# Patient Record
Sex: FEMALE | Race: WHITE | NOT HISPANIC OR LATINO
[De-identification: names, ages, dates, MRNs, and addresses within clinical notes are randomized per-mention and may not be internally consistent; named-entity substitution may affect disease eponyms.]

---

## 2019-02-26 ENCOUNTER — RESULT REVIEW (OUTPATIENT)
Age: 59
End: 2019-02-26

## 2021-04-27 ENCOUNTER — RESULT REVIEW (OUTPATIENT)
Age: 61
End: 2021-04-27

## 2021-06-22 DIAGNOSIS — Z87.828 PERSONAL HISTORY OF OTHER (HEALED) PHYSICAL INJURY AND TRAUMA: ICD-10-CM

## 2021-07-07 ENCOUNTER — APPOINTMENT (OUTPATIENT)
Dept: CARDIOLOGY | Facility: CLINIC | Age: 61
End: 2021-07-07

## 2022-03-13 ENCOUNTER — EMERGENCY (EMERGENCY)
Facility: HOSPITAL | Age: 62
LOS: 1 days | Discharge: HOME | End: 2022-03-13
Attending: STUDENT IN AN ORGANIZED HEALTH CARE EDUCATION/TRAINING PROGRAM | Admitting: STUDENT IN AN ORGANIZED HEALTH CARE EDUCATION/TRAINING PROGRAM
Payer: COMMERCIAL

## 2022-03-13 VITALS
RESPIRATION RATE: 20 BRPM | DIASTOLIC BLOOD PRESSURE: 70 MMHG | TEMPERATURE: 98 F | SYSTOLIC BLOOD PRESSURE: 150 MMHG | OXYGEN SATURATION: 98 % | HEIGHT: 66 IN | HEART RATE: 100 BPM

## 2022-03-13 LAB
ALBUMIN SERPL ELPH-MCNC: 4.1 G/DL — SIGNIFICANT CHANGE UP (ref 3.3–5)
ALP SERPL-CCNC: 73 U/L — SIGNIFICANT CHANGE UP (ref 40–120)
ALT FLD-CCNC: 41 U/L — HIGH (ref 4–33)
ANION GAP SERPL CALC-SCNC: 13 MMOL/L — SIGNIFICANT CHANGE UP (ref 7–14)
AST SERPL-CCNC: 14 U/L — SIGNIFICANT CHANGE UP (ref 4–32)
B PERT DNA SPEC QL NAA+PROBE: SIGNIFICANT CHANGE UP
B PERT+PARAPERT DNA PNL SPEC NAA+PROBE: SIGNIFICANT CHANGE UP
BASOPHILS # BLD AUTO: 0.04 K/UL — SIGNIFICANT CHANGE UP (ref 0–0.2)
BASOPHILS NFR BLD AUTO: 0.5 % — SIGNIFICANT CHANGE UP (ref 0–2)
BILIRUB SERPL-MCNC: 0.4 MG/DL — SIGNIFICANT CHANGE UP (ref 0.2–1.2)
BORDETELLA PARAPERTUSSIS (RAPRVP): SIGNIFICANT CHANGE UP
BUN SERPL-MCNC: 16 MG/DL — SIGNIFICANT CHANGE UP (ref 7–23)
C PNEUM DNA SPEC QL NAA+PROBE: SIGNIFICANT CHANGE UP
CALCIUM SERPL-MCNC: 9 MG/DL — SIGNIFICANT CHANGE UP (ref 8.4–10.5)
CHLORIDE SERPL-SCNC: 98 MMOL/L — SIGNIFICANT CHANGE UP (ref 98–107)
CO2 SERPL-SCNC: 25 MMOL/L — SIGNIFICANT CHANGE UP (ref 22–31)
CREAT SERPL-MCNC: 0.82 MG/DL — SIGNIFICANT CHANGE UP (ref 0.5–1.3)
EGFR: 81 ML/MIN/1.73M2 — SIGNIFICANT CHANGE UP
EOSINOPHIL # BLD AUTO: 0.16 K/UL — SIGNIFICANT CHANGE UP (ref 0–0.5)
EOSINOPHIL NFR BLD AUTO: 2.2 % — SIGNIFICANT CHANGE UP (ref 0–6)
FLUAV SUBTYP SPEC NAA+PROBE: SIGNIFICANT CHANGE UP
FLUBV RNA SPEC QL NAA+PROBE: SIGNIFICANT CHANGE UP
GLUCOSE SERPL-MCNC: 168 MG/DL — HIGH (ref 70–99)
HADV DNA SPEC QL NAA+PROBE: SIGNIFICANT CHANGE UP
HCOV 229E RNA SPEC QL NAA+PROBE: SIGNIFICANT CHANGE UP
HCOV HKU1 RNA SPEC QL NAA+PROBE: SIGNIFICANT CHANGE UP
HCOV NL63 RNA SPEC QL NAA+PROBE: SIGNIFICANT CHANGE UP
HCOV OC43 RNA SPEC QL NAA+PROBE: SIGNIFICANT CHANGE UP
HCT VFR BLD CALC: 35.3 % — SIGNIFICANT CHANGE UP (ref 34.5–45)
HGB BLD-MCNC: 11.3 G/DL — LOW (ref 11.5–15.5)
HMPV RNA SPEC QL NAA+PROBE: SIGNIFICANT CHANGE UP
HPIV1 RNA SPEC QL NAA+PROBE: SIGNIFICANT CHANGE UP
HPIV2 RNA SPEC QL NAA+PROBE: SIGNIFICANT CHANGE UP
HPIV3 RNA SPEC QL NAA+PROBE: DETECTED
HPIV4 RNA SPEC QL NAA+PROBE: SIGNIFICANT CHANGE UP
IANC: 5.97 K/UL — SIGNIFICANT CHANGE UP (ref 1.5–8.5)
IMM GRANULOCYTES NFR BLD AUTO: 0.5 % — SIGNIFICANT CHANGE UP (ref 0–1.5)
LYMPHOCYTES # BLD AUTO: 0.6 K/UL — LOW (ref 1–3.3)
LYMPHOCYTES # BLD AUTO: 8.1 % — LOW (ref 13–44)
M PNEUMO DNA SPEC QL NAA+PROBE: SIGNIFICANT CHANGE UP
MAGNESIUM SERPL-MCNC: 2.3 MG/DL — SIGNIFICANT CHANGE UP (ref 1.6–2.6)
MCHC RBC-ENTMCNC: 29.7 PG — SIGNIFICANT CHANGE UP (ref 27–34)
MCHC RBC-ENTMCNC: 32 GM/DL — SIGNIFICANT CHANGE UP (ref 32–36)
MCV RBC AUTO: 92.9 FL — SIGNIFICANT CHANGE UP (ref 80–100)
MONOCYTES # BLD AUTO: 0.57 K/UL — SIGNIFICANT CHANGE UP (ref 0–0.9)
MONOCYTES NFR BLD AUTO: 7.7 % — SIGNIFICANT CHANGE UP (ref 2–14)
NEUTROPHILS # BLD AUTO: 5.97 K/UL — SIGNIFICANT CHANGE UP (ref 1.8–7.4)
NEUTROPHILS NFR BLD AUTO: 81 % — HIGH (ref 43–77)
NRBC # BLD: 0 /100 WBCS — SIGNIFICANT CHANGE UP
NRBC # FLD: 0 K/UL — SIGNIFICANT CHANGE UP
PHOSPHATE SERPL-MCNC: 2.7 MG/DL — SIGNIFICANT CHANGE UP (ref 2.5–4.5)
PLATELET # BLD AUTO: 240 K/UL — SIGNIFICANT CHANGE UP (ref 150–400)
POTASSIUM SERPL-MCNC: 3.3 MMOL/L — LOW (ref 3.5–5.3)
POTASSIUM SERPL-SCNC: 3.3 MMOL/L — LOW (ref 3.5–5.3)
PROT SERPL-MCNC: 7 G/DL — SIGNIFICANT CHANGE UP (ref 6–8.3)
RAPID RVP RESULT: DETECTED
RBC # BLD: 3.8 M/UL — SIGNIFICANT CHANGE UP (ref 3.8–5.2)
RBC # FLD: 15.7 % — HIGH (ref 10.3–14.5)
RSV RNA SPEC QL NAA+PROBE: SIGNIFICANT CHANGE UP
RV+EV RNA SPEC QL NAA+PROBE: SIGNIFICANT CHANGE UP
SARS-COV-2 RNA SPEC QL NAA+PROBE: SIGNIFICANT CHANGE UP
SODIUM SERPL-SCNC: 136 MMOL/L — SIGNIFICANT CHANGE UP (ref 135–145)
WBC # BLD: 7.38 K/UL — SIGNIFICANT CHANGE UP (ref 3.8–10.5)
WBC # FLD AUTO: 7.38 K/UL — SIGNIFICANT CHANGE UP (ref 3.8–10.5)

## 2022-03-13 PROCEDURE — 72148 MRI LUMBAR SPINE W/O DYE: CPT | Mod: 26,ME

## 2022-03-13 PROCEDURE — 99220: CPT | Mod: FS

## 2022-03-13 PROCEDURE — G1004: CPT

## 2022-03-13 PROCEDURE — 99285 EMERGENCY DEPT VISIT HI MDM: CPT

## 2022-03-13 PROCEDURE — 71046 X-RAY EXAM CHEST 2 VIEWS: CPT | Mod: 26

## 2022-03-13 PROCEDURE — 71250 CT THORAX DX C-: CPT | Mod: 26,MA

## 2022-03-13 PROCEDURE — 72128 CT CHEST SPINE W/O DYE: CPT | Mod: 26,MA

## 2022-03-13 PROCEDURE — 72131 CT LUMBAR SPINE W/O DYE: CPT | Mod: 26,MA

## 2022-03-13 RX ORDER — ASPIRIN/CALCIUM CARB/MAGNESIUM 324 MG
81 TABLET ORAL DAILY
Refills: 0 | Status: DISCONTINUED | OUTPATIENT
Start: 2022-03-14 | End: 2022-03-16

## 2022-03-13 RX ORDER — OXYCODONE HYDROCHLORIDE 5 MG/1
10 TABLET ORAL ONCE
Refills: 0 | Status: DISCONTINUED | OUTPATIENT
Start: 2022-03-13 | End: 2022-03-13

## 2022-03-13 RX ORDER — OXYCODONE HYDROCHLORIDE 5 MG/1
5 TABLET ORAL EVERY 6 HOURS
Refills: 0 | Status: DISCONTINUED | OUTPATIENT
Start: 2022-03-13 | End: 2022-03-13

## 2022-03-13 RX ORDER — LISINOPRIL 2.5 MG/1
40 TABLET ORAL DAILY
Refills: 0 | Status: DISCONTINUED | OUTPATIENT
Start: 2022-03-14 | End: 2022-03-16

## 2022-03-13 RX ORDER — MORPHINE SULFATE 50 MG/1
4 CAPSULE, EXTENDED RELEASE ORAL ONCE
Refills: 0 | Status: DISCONTINUED | OUTPATIENT
Start: 2022-03-13 | End: 2022-03-13

## 2022-03-13 RX ORDER — SERTRALINE 25 MG/1
50 TABLET, FILM COATED ORAL DAILY
Refills: 0 | Status: DISCONTINUED | OUTPATIENT
Start: 2022-03-14 | End: 2022-03-16

## 2022-03-13 RX ORDER — SODIUM CHLORIDE 9 MG/ML
1000 INJECTION INTRAMUSCULAR; INTRAVENOUS; SUBCUTANEOUS ONCE
Refills: 0 | Status: COMPLETED | OUTPATIENT
Start: 2022-03-13 | End: 2022-03-13

## 2022-03-13 RX ORDER — ACYCLOVIR SODIUM 500 MG
400 VIAL (EA) INTRAVENOUS DAILY
Refills: 0 | Status: DISCONTINUED | OUTPATIENT
Start: 2022-03-14 | End: 2022-03-16

## 2022-03-13 RX ORDER — LEVOTHYROXINE SODIUM 125 MCG
100 TABLET ORAL DAILY
Refills: 0 | Status: DISCONTINUED | OUTPATIENT
Start: 2022-03-14 | End: 2022-03-16

## 2022-03-13 RX ADMIN — SODIUM CHLORIDE 1000 MILLILITER(S): 9 INJECTION INTRAMUSCULAR; INTRAVENOUS; SUBCUTANEOUS at 10:58

## 2022-03-13 RX ADMIN — MORPHINE SULFATE 4 MILLIGRAM(S): 50 CAPSULE, EXTENDED RELEASE ORAL at 22:32

## 2022-03-13 RX ADMIN — MORPHINE SULFATE 4 MILLIGRAM(S): 50 CAPSULE, EXTENDED RELEASE ORAL at 13:20

## 2022-03-13 RX ADMIN — OXYCODONE HYDROCHLORIDE 5 MILLIGRAM(S): 5 TABLET ORAL at 17:02

## 2022-03-13 RX ADMIN — MORPHINE SULFATE 4 MILLIGRAM(S): 50 CAPSULE, EXTENDED RELEASE ORAL at 10:58

## 2022-03-13 RX ADMIN — MORPHINE SULFATE 4 MILLIGRAM(S): 50 CAPSULE, EXTENDED RELEASE ORAL at 20:49

## 2022-03-13 RX ADMIN — OXYCODONE HYDROCHLORIDE 5 MILLIGRAM(S): 5 TABLET ORAL at 17:47

## 2022-03-13 NOTE — ED PROVIDER NOTE - PHYSICAL EXAMINATION
Gen: WDWN, NAD  HEENT: EOMI, no nasal discharge, mucous membranes moist  CV: sinus tachy, +S1/S2, no M/R/G  Resp: CTAB, + persistent coughing  GI: Abdomen soft non-distended, NTTP, no masses  MSK: No open wounds, no bruising, no LE edema, + ttp lower thoracic, upper lumbar spine and overlying iliac spines  Neuro: A&Ox4, following commands, moving all four extremities spontaneously  Psych: anxious, tearful

## 2022-03-13 NOTE — ED CDU PROVIDER INITIAL DAY NOTE - MEDICAL DECISION MAKING DETAILS
60 y/o F pmh Multiple myeloma (on active therapy), hypothyroidism presented to ED with worsening lower back pain and a cough. No LE weakness, sensory changes, or bladder/bowel incontinence. In ED, RVP positive for parainfluenza. Pt received CTs of chest, thoracic, and lumbar spine. CT "Multiple lytic lesions scattered throughout the osseous structures likely secondary to the patient's known diagnosis of multiple myeloma. The largest lesion is seen within the right side of the sacrum without gross  associated pathologic fracture. Mild age-indeterminate superior endplate fracture of the L5 vertebral body. No bony retropulsion. Multilevel degenerative changes of the spine." Pt seen by heme/onc.  Pt placed in CDU for MRI lumbar spine and pain control

## 2022-03-13 NOTE — CONSULT NOTE ADULT - ASSESSMENT
61F with subacute superior endplate fx L5    Recc:  - no acute neurosurgical intervention warranted  - Pain control    d/w attending

## 2022-03-13 NOTE — ED PROVIDER NOTE - NSICDXPASTMEDICALHX_GEN_ALL_CORE_FT
PAST MEDICAL HISTORY:  Anxiety Disorder     Hypothyroidism     No Past Surgical History     Polyp of Corpus Uteri

## 2022-03-13 NOTE — ED PROVIDER NOTE - OBJECTIVE STATEMENT
61F hx Multiple myeloma (on active therapy), hypothyroidism, p/w multiple complaints. Reports one week of persistent dry cough, states she was prescribed a cephalosporin w/o change in symptoms or improvement in illness course. Also notes progressively worsening lower back pain, denies similar pain in the past. Denies recent trauma or injury to spine. No LE weakness or bowel/urinary incontinence or retention. Has not taken anti-inflammatories. Called on-call at [facility] who referred her to ED. Feels fullness in her chest. No fever, n/v/d/c,  abd pain, dizziness, dysuria/hematuria.

## 2022-03-13 NOTE — ED PROVIDER NOTE - CLINICAL SUMMARY MEDICAL DECISION MAKING FREE TEXT BOX
Paradies PGY3: suspect viral illness, will eval w/ RVP vs bacterial PNA. Concern for structural injury in lower spine given known hx vertebral lesions in setting of MM, will plan for spinal imaging. Will f/w heme onc reccs.

## 2022-03-13 NOTE — ED ADULT TRIAGE NOTE - CHIEF COMPLAINT QUOTE
Pt has back pain after being diagnosed with bronchitis. Pt has multiple myeloma on treatment. Sent for evaluation.

## 2022-03-13 NOTE — ED PROVIDER NOTE - CARE PLAN
Principal Discharge DX:	Cough  Secondary Diagnosis:	Lower back pain  Secondary Diagnosis:	Multiple myeloma   1

## 2022-03-13 NOTE — CONSULT NOTE ADULT - SUBJECTIVE AND OBJECTIVE BOX
HPI:    This is a newly diagnosed 61 year old female with history of IgGKappa Multiple myeloma R-ISS Stage II presently on SHERIF- RVD followed by Dr. Thor RANDLE and Dr. José Miguel SAWYER In 2020 She had a bone marrow aspirate and biopsy which failed to show significant plasmacytosis suggestive of myeloma. She had imaging including MRI and PET scan which we reviewed today showing distracted lesion in the right sacrum highly suspicious for plasmacytoma. Biopsy revealed plasma cell neoplasm consistent with solitary plasmacytoma. She completed radiation therapy on August 26, 2020. She had a continuously rising paraprotein study and MRI on October 26, 2021 showed increased number of osseous lesions consistent with multiple myeloma with more than 10 lesions identified. December 9, 2021 began treatment with lenalidomide, bortezomib, dexamethasone and daratumumab as well as monthly denosumab. warren now presented to office with worsening brochitis as an outpatient with one week of persistent dry cough, states she was prescribed a cephalosporin w/o change in symptoms or improvement in illness course. Also notes progressively worsening lower back pain.     PAST MEDICAL & SURGICAL HISTORY:  Anxiety Disorder    Polyp of Corpus Uteri    Hypothyroidism    No Past Surgical History        Allergies    No Known Drug Allergies  shellfish (Other; Swelling)    Intolerances        MEDICATIONS  (STANDING):    MEDICATIONS  (PRN):      FAMILY HISTORY:      SOCIAL HISTORY: No EtOH, no tobacco    REVIEW OF SYSTEMS:    CONSTITUTIONAL: No weakness, fevers or chills  EYES/ENT: No visual changes;  No vertigo or throat pain   NECK: No pain or stiffness  RESPIRATORY: No cough, wheezing, hemoptysis; No shortness of breath  CARDIOVASCULAR: No chest pain or palpitations  GASTROINTESTINAL: No abdominal or epigastric pain. No nausea, vomiting, or hematemesis; No diarrhea or constipation. No melena or hematochezia.  GENITOURINARY: No dysuria, frequency or hematuria  NEUROLOGICAL: No numbness or weakness  SKIN: No itching, burning, rashes, or lesions   All other review of systems is negative unless indicated above.    Height (cm): 167.6 (03-13 @ 09:53)    T(F): 98.9 (03-13-22 @ 11:04), Max: 98.9 (03-13-22 @ 11:04)  HR: 87 (03-13-22 @ 11:04)  BP: 146/59 (03-13-22 @ 11:04)  RR: 17 (03-13-22 @ 11:04)  SpO2: 100% (03-13-22 @ 11:04)  Wt(kg): --    GENERAL: NAD, well-developed  HEAD:  Atraumatic, Normocephalic  EYES: EOMI, PERRLA, conjunctiva and sclera clear  NECK: Supple, No JVD  CHEST/LUNG: Clear to auscultation bilaterally; No wheeze  HEART: Regular rate and rhythm; No murmurs, rubs, or gallops  ABDOMEN: Soft, Nontender, Nondistended; Bowel sounds present  EXTREMITIES:  2+ Peripheral Pulses, No clubbing, cyanosis, or edema  NEUROLOGY: non-focal  SKIN: No rashes or lesions                          11.3   7.38  )-----------( 240      ( 13 Mar 2022 10:45 )             35.3       03-13    136  |  98  |  16  ----------------------------<  168<H>  3.3<L>   |  25  |  0.82    Ca    9.0      13 Mar 2022 10:43  Phos  2.7     03-13  Mg     2.30     03-13    TPro  7.0  /  Alb  4.1  /  TBili  0.4  /  DBili  x   /  AST  14  /  ALT  41<H>  /  AlkPhos  73  03-13      Magnesium, Serum: 2.30 mg/dL (03-13 @ 10:43)  Phosphorus Level, Serum: 2.7 mg/dL (03-13 @ 10:43)           HPI:    This is a newly diagnosed 61 year old female with history of IgGKappa Multiple myeloma R-ISS Stage II presently on SHERIF- RVD followed by Dr. Thor RANDLE and Dr. José Miguel SAWYER In 2020 She had a bone marrow aspirate and biopsy which failed to show significant plasmacytosis suggestive of myeloma. She had imaging including MRI and PET scan which we reviewed today showing distracted lesion in the right sacrum highly suspicious for plasmacytoma. Biopsy revealed plasma cell neoplasm consistent with solitary plasmacytoma. She completed radiation therapy on August 26, 2020. She had a continuously rising paraprotein study and MRI on October 26, 2021 showed increased number of osseous lesions consistent with multiple myeloma with more than 10 lesions identified. December 9, 2021 began treatment with lenalidomide, bortezomib, dexamethasone and daratumumab as well as monthly denosumab. warren now presented to office with worsening brochitis as an outpatient with one week of persistent dry cough, states she was prescribed a cephalosporin w/o change in symptoms or improvement in illness course. Also notes progressively worsening lower back pain.     PAST MEDICAL & SURGICAL HISTORY:  Anxiety Disorder    Polyp of Corpus Uteri    Hypothyroidism    No Past Surgical History        Allergies    No Known Drug Allergies  shellfish (Other; Swelling)    Intolerances          SOCIAL HISTORY: No EtOH, no tobacco    REVIEW OF SYSTEMS:    CONSTITUTIONAL: No weakness, fevers or chills  EYES/ENT: No visual changes;  No vertigo or throat pain   NECK: No pain or stiffness  RESPIRATORY: No cough, wheezing, hemoptysis; No shortness of breath  CARDIOVASCULAR: No chest pain or palpitations  GASTROINTESTINAL: No abdominal or epigastric pain. No nausea, vomiting, or hematemesis; No diarrhea or constipation. No melena or hematochezia.  GENITOURINARY: No dysuria, frequency or hematuria  NEUROLOGICAL: No numbness or weakness  SKIN: No itching, burning, rashes, or lesions   All other review of systems is negative unless indicated above.    Height (cm): 167.6 (03-13 @ 09:53)    T(F): 98.9 (03-13-22 @ 11:04), Max: 98.9 (03-13-22 @ 11:04)  HR: 87 (03-13-22 @ 11:04)  BP: 146/59 (03-13-22 @ 11:04)  RR: 17 (03-13-22 @ 11:04)  SpO2: 100% (03-13-22 @ 11:04)  Wt(kg): --    GENERAL: NAD, well-developed  HEAD:  Atraumatic, Normocephalic  EYES: EOMI, PERRLA, conjunctiva and sclera clear  NECK: Supple, No JVD  CHEST/LUNG: Clear to auscultation bilaterally; No wheeze  HEART: Regular rate and rhythm; No murmurs, rubs, or gallops  ABDOMEN: Soft, Nontender, Nondistended; Bowel sounds present  EXTREMITIES:  2+ Peripheral Pulses, No clubbing, cyanosis, or edema  NEUROLOGY: non-focal  SKIN: No rashes or lesions                          11.3   7.38  )-----------( 240      ( 13 Mar 2022 10:45 )             35.3       03-13    136  |  98  |  16  ----------------------------<  168<H>  3.3<L>   |  25  |  0.82    Ca    9.0      13 Mar 2022 10:43  Phos  2.7     03-13  Mg     2.30     03-13    TPro  7.0  /  Alb  4.1  /  TBili  0.4  /  DBili  x   /  AST  14  /  ALT  41<H>  /  AlkPhos  73  03-13      Magnesium, Serum: 2.30 mg/dL (03-13 @ 10:43)  Phosphorus Level, Serum: 2.7 mg/dL (03-13 @ 10:43)

## 2022-03-13 NOTE — ED CDU PROVIDER INITIAL DAY NOTE - OBJECTIVE STATEMENT
61F hx Multiple myeloma (on active therapy), hypothyroidism, p/w multiple complaints. Reports one week of persistent dry cough, states she was prescribed a cephalosporin w/o change in symptoms or improvement in illness course. Also notes progressively worsening lower back pain, denies similar pain in the past. Denies recent trauma or injury to spine. No LE weakness or bowel/urinary incontinence or retention. Has not taken anti-inflammatories. Called on-call at [facility] who referred her to ED. Feels fullness in her chest. No fever, n/v/d/c,  abd pain, dizziness, dysuria/hematuria.    MACK Tejada: Agree with above. 60 y/o F pmh Multiple myeloma (on active therapy), hypothyroidism presented to ED with worsening lower back pain and a cough. No LE weakness, sensory changes, or bladder/bowel incontinence. In ED, RVP positive for parainfluenza. Pt received CTs of chest, thoracic, and lumbar spine. CT "Multiple lytic lesions scattered throughout the osseous structures likely secondary to the patient's known diagnosis of multiple myeloma. The largest lesion is seen within the right side of the sacrum without gross  associated pathologic fracture. Mild age-indeterminate superior endplate fracture of the L5 vertebral body. No bony retropulsion. Multilevel degenerative changes of the spine." Pt seen by heme/onc.  Pt placed in CDU for MRI lumbar spine and pain control

## 2022-03-13 NOTE — ED ADULT NURSE NOTE - OBJECTIVE STATEMENT
pt received spot 23. pt A+Ox3, c/o lower back pain x 2 days. pt states she was recently diagnosed with bronchitis and prescribed antibiotics and steroids. reports back pain is worse with movement and coughing. has hx of multiple myeloma. sent to ED by oncologist. denies fever/chills. respirations even and unlabored. vss. NSR on cm. labs sent. IVSL in place. will monitor.

## 2022-03-13 NOTE — ED CDU PROVIDER INITIAL DAY NOTE - NS ED ATTENDING STATEMENT MOD
This was a shared visit with the DAYAN. I reviewed and verified the documentation and independently performed the documented:

## 2022-03-13 NOTE — ED CDU PROVIDER INITIAL DAY NOTE - ATTENDING CONTRIBUTION TO CARE
61F w h/o MM p/w low back pain, ct in ED found to have multiple lesions including large R sided lesion to the sacrum associated w/ fracture, placed in CDU for mri and spine cs

## 2022-03-13 NOTE — CONSULT NOTE ADULT - SUBJECTIVE AND OBJECTIVE BOX
NEUROSURGERY CONSULT    HPI: 61y Female pmhx MM on treatment, hypothyroidism p/w LBP x 3 days. CT spine with multiple lytics lesion L5 superior endplate fx, Patient denies any numbness, tingling, radiculopathy, no bowel or bladder incontinence.     RADIOLOGY: IMPRESSION: Subacute mild superior endplate fracture deformity of L5 with   minimal bone marrow edema signal. No bony retropulsion. No conus   medullaris or cauda equina compression.    Redemonstration of multiple rounded marrow replacing lesions within the   lumbar vertebral bodies, posterior elements, as well as a large lesion in   the right side of the sacrum, compatible with patient's provided history   of multiple myeloma.      MEDS:  benzonatate 100 milliGRAM(s) Oral every 8 hours PRN  morphine  - Injectable 4 milliGRAM(s) IV Push once  oxyCODONE    IR 5 milliGRAM(s) Oral every 6 hours PRN      PHYSICAL EXAM: awake, alert x 3, fc  perrl, tracts  HINES 5/5  SILT  no clnous  Vital Signs Last 24 Hrs  T(C): 36.8 (13 Mar 2022 19:17), Max: 37.2 (13 Mar 2022 11:04)  T(F): 98.3 (13 Mar 2022 19:17), Max: 98.9 (13 Mar 2022 11:04)  HR: 72 (13 Mar 2022 19:17) (64 - 100)  BP: 167/82 (13 Mar 2022 19:17) (132/63 - 167/82)  BP(mean): --  RR: 18 (13 Mar 2022 19:17) (17 - 20)  SpO2: 100% (13 Mar 2022 19:17) (98% - 100%)    LABS:                        11.3   7.38  )-----------( 240      ( 13 Mar 2022 10:45 )             35.3     03-13    136  |  98  |  16  ----------------------------<  168<H>  3.3<L>   |  25  |  0.82    Ca    9.0      13 Mar 2022 10:43  Phos  2.7     03-13  Mg     2.30     03-13    TPro  7.0  /  Alb  4.1  /  TBili  0.4  /  DBili  x   /  AST  14  /  ALT  41<H>  /  AlkPhos  73  03-13

## 2022-03-13 NOTE — CONSULT NOTE ADULT - ASSESSMENT
62 yo female with history of IgGKappa Multiple myeloma now presetnly on SHERIF- RVD - Quad therapy now with sCR  with current therapy now presenting with progressive cough and worsening back pain.     Multiple Myeloma   - would reccommend Axiall Advanced imaging, Mri W/WO T/L spine   - patient known to have axial disease with known lytic lesions   - Patient last treated on 2/24/2022   - most recent labs as outpateint labs and bone marrow completed over the summer were consistent with CR     Cough   - would recomend non contrast CT of chest to assesss for possible pneumonitis   - albeit paiten thas been treated empirically for possibel PNA as outpatient   - sherif has been a/w pneumonitis   - if radiographic evidence does suggest diffuse pattern would start empiric sterioids with MEdrol 40 q 8     Alfonzo Nguyễn MD   Hematology/ Oncology   [facility]   [facility]   C: [phone]  [address] [place], [place]  P:[phone]  F:[phone]  and [phone]  [address] [place], [place]   P:[phone]  F:[phone] 60 yo female with history of IgGKappa Multiple myeloma now presetnly on SHERIF- RVD - Quad therapy now with sCR  with current therapy now presenting with progressive cough and worsening back pain.     Multiple Myeloma   - would recommend CT SPINE L -   - patient known to have axial disease with known lytic lesions   - Patient last treated on 2/24/2022   - most recent labs as outpateint labs and bone marrow completed over the summer were consistent with CR   - PRIOR IMAGING: the largest lesion is in the sacrum at the level of S1 and S2 measuring 5.4 x 5.2 cm, not significantly changed from the prior PET exam.   - There is probable involvement of the right S1 and S2 neural foramina with probably some central canal involvement as well.  - REVIEWED IMAGING AND SACRAL LESION DEMONSTRATED previously possible involvement of Sacral Neural foramina ,NEEDS MRI LUMBO SACRAL To EVALUATE FOR CAUDA  - discussed with ER STAFF    Cough   - Now found to have Parainfluenza     Alfonzo Nguyễn MD   Hematology/ Oncology   [facility]   [facility]   C: [phone]  [address] [place], [place]  P:[phone]  F:[phone]  and [phone]  [address] [place], [place]   P:[phone]  F:[phone]

## 2022-03-13 NOTE — ED PROVIDER NOTE - ATTENDING CONTRIBUTION TO CARE
61F w h/o multiple myeloma, hypothyroidism p/w 1 week of cough, rhinorrhea, and worsening lower back pain. Patient is s/p course of abx for cough but unclear PNA dx. Denies trauma or injuries. Denies numbness, weakness, bowel/bladder incontinence, saddle anesthesia    Except as documented in the HPI,  all other systems are negative    CONSTITUTIONAL: NAD, awake, alert  HEAD: Normocephalic; atraumatic  ENMT: External appears normal, MMM  NECK: no tenderness, FROM  CARD: Normal Sl, S2; no audible murmurs  RESP: normal wob, lungs ctab  ABD: soft, non-distended; non-tender  MSK: no edema, normal ROM in all four extremities  SKIN: Warm, dry, no rashes  NEURO: aaox3, moving all extremities spontaneously, 5/5 strength throughout, sensation grossly intact     61F w h/o multiple myeloma, hypothyroidism p/w 1 week of cough, rhinorrhea, and worsening lower back pain, will obtain cts to evaluate for occult fracture, rvp, labs, neuro intact, no red flags

## 2022-03-14 VITALS
SYSTOLIC BLOOD PRESSURE: 143 MMHG | OXYGEN SATURATION: 100 % | RESPIRATION RATE: 18 BRPM | HEART RATE: 70 BPM | DIASTOLIC BLOOD PRESSURE: 71 MMHG | TEMPERATURE: 98 F

## 2022-03-14 LAB
ALBUMIN SERPL ELPH-MCNC: 3.6 G/DL — SIGNIFICANT CHANGE UP (ref 3.3–5)
ALP SERPL-CCNC: 63 U/L — SIGNIFICANT CHANGE UP (ref 40–120)
ALT FLD-CCNC: 26 U/L — SIGNIFICANT CHANGE UP (ref 4–33)
ANION GAP SERPL CALC-SCNC: 12 MMOL/L — SIGNIFICANT CHANGE UP (ref 7–14)
AST SERPL-CCNC: 10 U/L — SIGNIFICANT CHANGE UP (ref 4–32)
BILIRUB SERPL-MCNC: 0.3 MG/DL — SIGNIFICANT CHANGE UP (ref 0.2–1.2)
BUN SERPL-MCNC: 10 MG/DL — SIGNIFICANT CHANGE UP (ref 7–23)
CALCIUM SERPL-MCNC: 8.4 MG/DL — SIGNIFICANT CHANGE UP (ref 8.4–10.5)
CHLORIDE SERPL-SCNC: 101 MMOL/L — SIGNIFICANT CHANGE UP (ref 98–107)
CO2 SERPL-SCNC: 24 MMOL/L — SIGNIFICANT CHANGE UP (ref 22–31)
CREAT SERPL-MCNC: 0.68 MG/DL — SIGNIFICANT CHANGE UP (ref 0.5–1.3)
EGFR: 99 ML/MIN/1.73M2 — SIGNIFICANT CHANGE UP
GLUCOSE SERPL-MCNC: 157 MG/DL — HIGH (ref 70–99)
POTASSIUM SERPL-MCNC: 3.6 MMOL/L — SIGNIFICANT CHANGE UP (ref 3.5–5.3)
POTASSIUM SERPL-SCNC: 3.6 MMOL/L — SIGNIFICANT CHANGE UP (ref 3.5–5.3)
PROT SERPL-MCNC: 6 G/DL — SIGNIFICANT CHANGE UP (ref 6–8.3)
SODIUM SERPL-SCNC: 137 MMOL/L — SIGNIFICANT CHANGE UP (ref 135–145)

## 2022-03-14 PROCEDURE — 99217: CPT | Mod: FS

## 2022-03-14 RX ORDER — OXYCODONE HYDROCHLORIDE 5 MG/1
10 TABLET ORAL ONCE
Refills: 0 | Status: DISCONTINUED | OUTPATIENT
Start: 2022-03-14 | End: 2022-03-14

## 2022-03-14 RX ORDER — OXYCODONE HYDROCHLORIDE 5 MG/1
1 TABLET ORAL
Qty: 12 | Refills: 0
Start: 2022-03-14 | End: 2022-03-16

## 2022-03-14 RX ADMIN — Medication 400 MILLIGRAM(S): at 09:15

## 2022-03-14 RX ADMIN — SERTRALINE 50 MILLIGRAM(S): 25 TABLET, FILM COATED ORAL at 09:15

## 2022-03-14 RX ADMIN — OXYCODONE HYDROCHLORIDE 10 MILLIGRAM(S): 5 TABLET ORAL at 10:05

## 2022-03-14 RX ADMIN — OXYCODONE HYDROCHLORIDE 10 MILLIGRAM(S): 5 TABLET ORAL at 01:24

## 2022-03-14 RX ADMIN — Medication 100 MICROGRAM(S): at 05:50

## 2022-03-14 RX ADMIN — OXYCODONE HYDROCHLORIDE 10 MILLIGRAM(S): 5 TABLET ORAL at 09:09

## 2022-03-14 RX ADMIN — LISINOPRIL 40 MILLIGRAM(S): 2.5 TABLET ORAL at 05:50

## 2022-03-14 RX ADMIN — OXYCODONE HYDROCHLORIDE 10 MILLIGRAM(S): 5 TABLET ORAL at 00:05

## 2022-03-14 NOTE — ED CDU PROVIDER DISPOSITION NOTE - PATIENT PORTAL LINK FT
You can access the FollowMyHealth Patient Portal offered by [facility] by registering at the following website: [email or web address]. By joining [facility]’s FollowMyHealth portal, you will also be able to view your health information using other applications (apps) compatible with our system.

## 2022-03-14 NOTE — ED CDU PROVIDER DISPOSITION NOTE - CLINICAL COURSE
60 y/o female with pmhx of multiple myeloma on chemo, sacral lytic lesions, hypothyroidism, presents to ED c/o cough and back pain x 1 week. pt found with parainfluenza, no pneumonia. sent to cdu for MRI, found with L5 subacute fracture. pt seen by neurosurgery and heme/onc and cleared for discharge home today. pain well controlled with oxycodone 10mg. pt has appointment with oncology at [place] in 3 days.

## 2022-03-14 NOTE — ED CDU PROVIDER DISPOSITION NOTE - NSFOLLOWUPINSTRUCTIONS_ED_ALL_ED_FT
Follow up with your Oncologist at [facility] this week  Take copy of results with you    Follow up with Neurosurgery for spinal fracture  Take Oxycodone 10mg every 6 hours as needed for severe pain, caution drowsiness, do not drive      Worsening, continued or new concerning symptoms return to the emergency department.

## 2022-03-14 NOTE — ED CDU PROVIDER SUBSEQUENT DAY NOTE - HISTORY
61F hx Multiple myeloma (on active therapy), hypothyroidism presents to ER w/ lower back pain. No LE weakness, sensory changes, or bladder/bowel incontinence. In ED, RVP positive for parainfluenza. Pt received CTs of chest, thoracic, and lumbar spine. CT "Multiple lytic lesions scattered throughout the osseous structures likely secondary to the patient's known diagnosis of multiple myeloma. The largest lesion is seen within the right side of the sacrum without gross  associated pathologic fracture. Mild age-indeterminate superior endplate fracture of the L5 vertebral body. MRI consistent w/ CT. NSX consulted recommend no further intervention at this time

## 2022-03-14 NOTE — ED CDU PROVIDER SUBSEQUENT DAY NOTE - ATTENDING CONTRIBUTION TO CARE
I (Mena) agree with above, I performed a history and physical. Counseled [facility] medical staff, physician assistant, and/or medical student on medical decision making as documented. Medical decisions and treatment interventions were made in real time during the patient encounter. Additionally and/or with the following exceptions:

## 2022-03-14 NOTE — PROGRESS NOTE ADULT - ASSESSMENT
62 yo female with history of IgGKappa Multiple myeloma now presetnly on SHERIF- RVD - Quad therapy now with sCR  with current therapy now presenting with progressive cough and worsening back pain.     Multiple Myeloma   - Noted multiple bone lesions. Patient with known axial disease and lytic lesions   - Patient last treated on 2/24/2022   - most recent labs as outpateint labs and bone marrow completed over the summer were consistent with CR   - PRIOR IMAGING: the largest lesion is in the sacrum at the level of S1 and S2 measuring 5.4 x 5.2 cm, not significantly changed from the prior PET exam.   - MRI reviewed. No L/S canal narrowing appreciated.   - Anticipate ongoing management of myeloma outpatient upon recovery from parainfluenza. Will touch base with Primary Oncology team to ensure no additional recommendations     Parainfluenza   - Ongoing medical management per medical team.     We will continue to follow along with you and would be happy to assist with any oncology questions on behalf of primary oncologist and [facility] team as well .   60 yo female with history of IgGKappa Multiple myeloma now presetnly on SHERIF- RVD - Quad therapy now with sCR  with current therapy now presenting with progressive cough and worsening back pain.     Multiple Myeloma   - Noted multiple bone lesions. Patient with known axial disease and lytic lesions   - Patient last treated on 2/24/2022   - most recent labs as outpateint labs and bone marrow completed over the summer were consistent with CR   - PRIOR IMAGING: the largest lesion is in the sacrum at the level of S1 and S2 measuring 5.4 x 5.2 cm, not significantly changed from the prior PET exam.   - MRI reviewed. No L/S canal narrowing appreciated. Fracture notes. Pain controlled   - Will plan to have her follow up with Dr Mcrae and [facility] transplant team as outpatient for ongoing management of Myeloma      Parainfluenza   - Stable comfortable on room air.   - Ongoing supportive treatment     Patient is dicharged for home. Recommend following up outpatient with Med/Onc. She is agreeable to plan.     Elvin Sheets D.O  Hematology Oncology   [facility]  [phone] ( Office)   Evenings and weekends please call MD on call or office

## 2022-03-14 NOTE — PROGRESS NOTE ADULT - SUBJECTIVE AND OBJECTIVE BOX
Patient is a 61y old  Female who presents with a chief complaint of     MEDICATIONS  (STANDING):  acyclovir   Oral Tab/Cap 400 milliGRAM(s) Oral daily  aspirin  chewable 81 milliGRAM(s) Oral daily  levothyroxine 100 MICROGram(s) Oral daily  lisinopril 40 milliGRAM(s) Oral daily  sertraline 50 milliGRAM(s) Oral daily    MEDICATIONS  (PRN):  benzonatate 100 milliGRAM(s) Oral every 8 hours PRN Cough      ROS  No fever, sweats, chills  No epistaxis, HA, sore throat  No CP, SOB, cough, sputum  No n/v/d, abd pain, melena, hematochezia  No edema  No rash  No anxiety  No back pain, joint pain  No bleeding, bruising  No dysuria, hematuria    Vital Signs Last 24 Hrs  T(C): 36.8 (14 Mar 2022 05:45), Max: 37.2 (13 Mar 2022 11:04)  T(F): 98.2 (14 Mar 2022 05:45), Max: 98.9 (13 Mar 2022 11:04)  HR: 68 (14 Mar 2022 05:45) (60 - 100)  BP: 143/71 (14 Mar 2022 05:45) (132/63 - 167/82)  BP(mean): --  RR: 16 (14 Mar 2022 05:45) (16 - 20)  SpO2: 100% (14 Mar 2022 05:45) (98% - 100%)    PE  NAD  Awake, alert  Anicteric, MMM  RRR  CTAB  Abd soft, NT, ND  No c/c/e  No rash grossly  FROM                          11.3   7.38  )-----------( 240      ( 13 Mar 2022 10:45 )             35.3       03-14    137  |  101  |  10  ----------------------------<  157<H>  3.6   |  24  |  0.68    Ca    8.4      14 Mar 2022 07:22  Phos  2.7     03-13  Mg     2.30     03-13    TPro  6.0  /  Alb  3.6  /  TBili  0.3  /  DBili  x   /  AST  10  /  ALT  26  /  AlkPhos  63  03-14    MRI   COMPARISON: Prior CT examination of the abdomen and pelvis dated   3/13/2022.    FINDINGS: Previously seen age indeterminate mild superior endplate   fracture deformity of the L5 vertebral body demonstrates minimal bone   marrow edema signal in association. No bony retropulsion is seen.    Redemonstration of multiple marrow replacing foci within the lumbar   vertebral bodies, sacrum, and posterior elements, with the largest marrow   replacing lesion in the right side of the sacrum.    The lumbosacral alignment is maintained. No additional loss of vertebral   body height is seen. The conus medullaris appears unremarkable in signal   and morphology and terminates appropriately. There is no conus medullaris   or cauda equina compression.    Disc spaces are grossly preserved with the exception of the L5-S1 level   where there is mild desiccation.    No lumbar spinal canal or foraminal narrowing is appreciated.    IMPRESSION: Subacute mild superior endplate fracture deformity of L5 with   minimal bone marrow edema signal. No bony retropulsion. No conus   medullaris or cauda equina compression.    Redemonstration of multiple rounded marrow replacing lesions within the   lumbar vertebral bodies, posterior elements, as well as a large lesion in   the right side of the sacrum, compatible with patient's provided history   of multiple myeloma.    --- End of Report ---    WISAM ESTRADA MD; Attending Radiologist    CT C/T/L spine     FINDINGS:  Ill-defined lesion within the right sacral region measures 5.1 x 4.8 cm   (5-194) with extension into the sacral canal. The lesion also marginates   around the right S1, S2, S3 neural foramen. No gross pathologic fracture   is seen in association. Scattered multiple lytic lesions throughout the   bilateral ribs, sternum, cervical, scapula, thoracic and lumbar spine,   notably at T12.    Generalized osteopenia is seen.    Multilevel degenerative spondyloarthritis and degenerative disc disease   are present. Findings include marginal osteophytes, uncovertebral   spurring, loss of normal disc space height and endplate sclerosis, and   facet joint space compartment narrowing with subchondral sclerosis and   hypertrophic osteophytes at multiple levels.    THORACIC SPINE:  Vertebral body heights are maintained. No vertebral fracture is seen.   Alignment is preserved.  Facet joints appear aligned.    Multilevel intervertebral disc space narrowing. No high-grade central   canal compromise is recognized by the CT technique.  Neural foramen   appear intact.    LUMBAR SPINE:    There is an age indeterminate mild superior endplate fracture deformity   of L5. There is no bony retropulsion.    The lumbosacral alignment is intact. Otherwise, the vertebral body   heights are maintained. There is no traumatic malalignment. Facet   alignment is preserved.    There is no evidence of epidural hematoma.    Miscellaneous: The ventricular chambers appear slightly hypodense when   compared to the interventricular septum, for which anemia can be   considered.    IMPRESSION:  Multiple lytic lesions scattered throughout the osseous structures likely   secondary to the patient's known diagnosis of multiple myeloma. The   largest lesion is seen within the right side of the sacrum without gross   associated pathologic fracture.    Mild age-indeterminate superior endplate fracture of the L5 vertebral   body. No bony retropulsion.    Multilevel degenerative changes of the spine.    --- End of Report ---          This document has been electronically signed. Mar 13 2022  7:02PM   Patient is a 61y old  Female who presents with a chief complaint of   Patient notes she feels better pain controlled. Scheduled treatment on Thursday with Dr Mcrae.     MEDICATIONS  (STANDING):  acyclovir   Oral Tab/Cap 400 milliGRAM(s) Oral daily  aspirin  chewable 81 milliGRAM(s) Oral daily  levothyroxine 100 MICROGram(s) Oral daily  lisinopril 40 milliGRAM(s) Oral daily  sertraline 50 milliGRAM(s) Oral daily    MEDICATIONS  (PRN):  benzonatate 100 milliGRAM(s) Oral every 8 hours PRN Cough      ROS  No fever, sweats, chills  No epistaxis, HA, sore throat  No CP, SOB,  No n/v/d, abd pain, melena, hematochezia  No edema  No rash  No anxiety  + lower lumbar back pain controlled  No bleeding, bruising  No dysuria, hematuria    Vital Signs Last 24 Hrs  T(C): 36.8 (14 Mar 2022 05:45), Max: 37.2 (13 Mar 2022 11:04)  T(F): 98.2 (14 Mar 2022 05:45), Max: 98.9 (13 Mar 2022 11:04)  HR: 68 (14 Mar 2022 05:45) (60 - 100)  BP: 143/71 (14 Mar 2022 05:45) (132/63 - 167/82)  BP(mean): --  RR: 16 (14 Mar 2022 05:45) (16 - 20)  SpO2: 100% (14 Mar 2022 05:45) (98% - 100%)    PE  NAD, ambulating in room comfortable NAD   Awake, alert  Anicteric  Normal respiratory effort   Abd ND  No edema   No rash grossly  FROM                          11.3   7.38  )-----------( 240      ( 13 Mar 2022 10:45 )             35.3       03-14    137  |  101  |  10  ----------------------------<  157<H>  3.6   |  24  |  0.68    Ca    8.4      14 Mar 2022 07:22  Phos  2.7     03-13  Mg     2.30     03-13    TPro  6.0  /  Alb  3.6  /  TBili  0.3  /  DBili  x   /  AST  10  /  ALT  26  /  AlkPhos  63  03-14    MRI   COMPARISON: Prior CT examination of the abdomen and pelvis dated   3/13/2022.    FINDINGS: Previously seen age indeterminate mild superior endplate   fracture deformity of the L5 vertebral body demonstrates minimal bone   marrow edema signal in association. No bony retropulsion is seen.    Redemonstration of multiple marrow replacing foci within the lumbar   vertebral bodies, sacrum, and posterior elements, with the largest marrow   replacing lesion in the right side of the sacrum.    The lumbosacral alignment is maintained. No additional loss of vertebral   body height is seen. The conus medullaris appears unremarkable in signal   and morphology and terminates appropriately. There is no conus medullaris   or cauda equina compression.    Disc spaces are grossly preserved with the exception of the L5-S1 level   where there is mild desiccation.    No lumbar spinal canal or foraminal narrowing is appreciated.    IMPRESSION: Subacute mild superior endplate fracture deformity of L5 with   minimal bone marrow edema signal. No bony retropulsion. No conus   medullaris or cauda equina compression.    Redemonstration of multiple rounded marrow replacing lesions within the   lumbar vertebral bodies, posterior elements, as well as a large lesion in   the right side of the sacrum, compatible with patient's provided history   of multiple myeloma.    --- End of Report ---    WISAM ESTRADA MD; Attending Radiologist    CT C/T/L spine     FINDINGS:  Ill-defined lesion within the right sacral region measures 5.1 x 4.8 cm   (5-194) with extension into the sacral canal. The lesion also marginates   around the right S1, S2, S3 neural foramen. No gross pathologic fracture   is seen in association. Scattered multiple lytic lesions throughout the   bilateral ribs, sternum, cervical, scapula, thoracic and lumbar spine,   notably at T12.    Generalized osteopenia is seen.    Multilevel degenerative spondyloarthritis and degenerative disc disease   are present. Findings include marginal osteophytes, uncovertebral   spurring, loss of normal disc space height and endplate sclerosis, and   facet joint space compartment narrowing with subchondral sclerosis and   hypertrophic osteophytes at multiple levels.    THORACIC SPINE:  Vertebral body heights are maintained. No vertebral fracture is seen.   Alignment is preserved.  Facet joints appear aligned.    Multilevel intervertebral disc space narrowing. No high-grade central   canal compromise is recognized by the CT technique.  Neural foramen   appear intact.    LUMBAR SPINE:    There is an age indeterminate mild superior endplate fracture deformity   of L5. There is no bony retropulsion.    The lumbosacral alignment is intact. Otherwise, the vertebral body   heights are maintained. There is no traumatic malalignment. Facet   alignment is preserved.    There is no evidence of epidural hematoma.    Miscellaneous: The ventricular chambers appear slightly hypodense when   compared to the interventricular septum, for which anemia can be   considered.    IMPRESSION:  Multiple lytic lesions scattered throughout the osseous structures likely   secondary to the patient's known diagnosis of multiple myeloma. The   largest lesion is seen within the right side of the sacrum without gross   associated pathologic fracture.    Mild age-indeterminate superior endplate fracture of the L5 vertebral   body. No bony retropulsion.    Multilevel degenerative changes of the spine.    --- End of Report ---          This document has been electronically signed. Mar 13 2022  7:02PM

## 2022-03-14 NOTE — ED CDU PROVIDER SUBSEQUENT DAY NOTE - MEDICAL DECISION MAKING DETAILS
60 y/o female with pmhx of multiple myeloma on chemo, sacral lytic lesions, hypothyroidism, presents to ED c/o cough and back pain x 1 week. pt found with parainfluenza, no pneumonia. sent to cdu for MRI, found with L5 subacute fracture. pt seen by neurosurgery and heme/onc and cleared for discharge home today. pain well controlled with oxycodone 10mg. pt has appointment with oncology at [facility] in 3 days.

## 2022-03-14 NOTE — ED CDU PROVIDER SUBSEQUENT DAY NOTE - NS ED ROS FT
Constitutional: (-) fever   Head: Normal cephalic, Atraumatic  Cardiovascular: (-) chest pain, (-) wheezing  Respiratory: (-) cough, (-) shortness of breath  Gastrointestinal: (-) vomiting, (-) diarrhea, (-) abdominal pain  : (-) dysuria   Musculoskeletal: (+) back pain  Integumentary: (-) rash, (-) edema  Neurological: (-)loc  Allergic/Immunologic: (-) pruritus

## 2022-03-14 NOTE — ED CDU PROVIDER SUBSEQUENT DAY NOTE - PROGRESS NOTE DETAILS
received sign out from MACK Lu. pt pain well controlled with oxycodone 10mg NAD putting make up on. amenable for dc home. discussed MRI and CT results, pt to f/u at [facility] on Thursday has appointment with her oncologist received sign out from MACK Lu. pt pain well controlled with oxycodone 10mg NAD putting make up on. amenable for dc home. discussed MRI and CT results, pt to f/u at [facility] on Thursday has appointment with her oncologist.

## 2022-03-18 LAB
CULTURE RESULTS: SIGNIFICANT CHANGE UP
CULTURE RESULTS: SIGNIFICANT CHANGE UP
SPECIMEN SOURCE: SIGNIFICANT CHANGE UP
SPECIMEN SOURCE: SIGNIFICANT CHANGE UP

## 2022-11-08 ENCOUNTER — RESULT REVIEW (OUTPATIENT)
Age: 62
End: 2022-11-08

## 2023-06-22 ENCOUNTER — APPOINTMENT (OUTPATIENT)
Dept: ORTHOPEDIC SURGERY | Facility: CLINIC | Age: 63
End: 2023-06-22
Payer: COMMERCIAL

## 2023-06-22 VITALS — WEIGHT: 150 LBS | HEIGHT: 65.5 IN | BODY MASS INDEX: 24.69 KG/M2

## 2023-06-22 DIAGNOSIS — I10 ESSENTIAL (PRIMARY) HYPERTENSION: ICD-10-CM

## 2023-06-22 DIAGNOSIS — E78.00 PURE HYPERCHOLESTEROLEMIA, UNSPECIFIED: ICD-10-CM

## 2023-06-22 DIAGNOSIS — C80.1 MALIGNANT (PRIMARY) NEOPLASM, UNSPECIFIED: ICD-10-CM

## 2023-06-22 PROCEDURE — 20550 NJX 1 TENDON SHEATH/LIGAMENT: CPT | Mod: LT

## 2023-06-22 PROCEDURE — J3490M: CUSTOM

## 2023-06-22 PROCEDURE — 99203 OFFICE O/P NEW LOW 30 MIN: CPT | Mod: 25

## 2023-06-22 NOTE — HISTORY OF PRESENT ILLNESS
[Gradual] : gradual [Sudden] : sudden [7] : 7 [5] : 5 [Dull/Aching] : dull/aching [Ice] : ice [de-identified] : L thumb pain and stiffness [] : no [FreeTextEntry1] : L  thumb  [FreeTextEntry3] : few weeks  [FreeTextEntry5] : she has pain and cant open thing, no injury  [FreeTextEntry9] : tylenol  [de-identified] : activity

## 2023-06-22 NOTE — ASSESSMENT
[FreeTextEntry1] : L thumb Trigger finger tendon sheath injection was performed because of pain inflammation and stiffness\par Anesthesia: ethyl chloride sprayed topically\par Celestone 6mg: An injection of Celestone 1cc\par Lidocaine: An injection of Lidocaine 1% 1cc\par Marcaine: An injection of Marcaine 0.5% 1cc\par \par Patient has tried OTC's including aspirin, Ibuprofen, Aleve etc or prescription NSAIDS, and/or exercises at home and/ or\par physical therapy without satisfactory response.\par After verbal consent using sterile preparation and technique. The risks, benefits, and alternatives to cortisone injection\par were explained in full to the patient. Risks outlined include but are not limited to infection, sepsis, bleeding, scarring, skin\par discoloration, temporary increase in pain, syncopal episode, failure to resolve symptoms, allergic reaction, symptom\par recurrence, and elevation of blood sugar in diabetics. Patient understood the risks. All questions were answered. After\par discussion of options, patient requested an injection. Oral informed consent was obtained and sterile prep was done of the\par injection site. Sterile technique was utilized for the procedure including the preparation of the solutions used for the\par injection. Patient tolerated the procedure well. Advised to ice the injection site this evening.\par Prep with betadine locally to site. Sterile technique used

## 2023-06-22 NOTE — PHYSICAL EXAM
[de-identified] : L hand: \par Mild swelling \par Tender 1st A1 pulley \par Decreased thumb ROM \par +thumb triggering\par

## 2023-06-26 ENCOUNTER — APPOINTMENT (OUTPATIENT)
Dept: ORTHOPEDIC SURGERY | Facility: CLINIC | Age: 63
End: 2023-06-26

## 2023-11-09 ENCOUNTER — APPOINTMENT (OUTPATIENT)
Dept: ORTHOPEDIC SURGERY | Facility: CLINIC | Age: 63
End: 2023-11-09

## 2023-11-13 ENCOUNTER — APPOINTMENT (OUTPATIENT)
Dept: ORTHOPEDIC SURGERY | Facility: CLINIC | Age: 63
End: 2023-11-13
Payer: COMMERCIAL

## 2023-11-13 DIAGNOSIS — M65.312 TRIGGER THUMB, LEFT THUMB: ICD-10-CM

## 2023-11-13 PROCEDURE — 99213 OFFICE O/P EST LOW 20 MIN: CPT | Mod: 25

## 2023-11-13 PROCEDURE — 20550 NJX 1 TENDON SHEATH/LIGAMENT: CPT | Mod: LT

## 2024-09-17 ENCOUNTER — OFFICE (OUTPATIENT)
Dept: URBAN - METROPOLITAN AREA CLINIC 27 | Facility: CLINIC | Age: 64
Setting detail: OPHTHALMOLOGY
End: 2024-09-17
Payer: COMMERCIAL

## 2024-09-17 DIAGNOSIS — H25.13: ICD-10-CM

## 2024-09-17 PROCEDURE — 99204 OFFICE O/P NEW MOD 45 MIN: CPT | Performed by: OPHTHALMOLOGY

## 2024-09-17 ASSESSMENT — CONFRONTATIONAL VISUAL FIELD TEST (CVF)
OD_FINDINGS: FULL
OS_FINDINGS: FULL

## 2024-10-02 ENCOUNTER — OFFICE (OUTPATIENT)
Dept: URBAN - METROPOLITAN AREA CLINIC 27 | Facility: CLINIC | Age: 64
Setting detail: OPHTHALMOLOGY
End: 2024-10-02
Payer: COMMERCIAL

## 2024-10-02 DIAGNOSIS — H25.12: ICD-10-CM

## 2024-10-02 DIAGNOSIS — H25.13: ICD-10-CM

## 2024-10-02 PROBLEM — Z96.1 PSEUDOPHAKIA-1 DAY P/O CAT W/ IOL: Status: ACTIVE | Noted: 2024-10-02

## 2024-10-02 PROCEDURE — 99213 OFFICE O/P EST LOW 20 MIN: CPT | Performed by: OPHTHALMOLOGY

## 2024-10-02 PROCEDURE — 92136 OPHTHALMIC BIOMETRY: CPT | Performed by: OPHTHALMOLOGY

## 2024-10-02 ASSESSMENT — REFRACTION_AUTOREFRACTION
OD_AXIS: 172
OS_SPHERE: -0.25
OS_AXIS: 174
OD_SPHERE: -1.25
OD_CYLINDER: +0.75
OS_CYLINDER: +1.50

## 2024-10-02 ASSESSMENT — VISUAL ACUITY
OD_BCVA: 20/70
OS_BCVA: 20/30-2

## 2024-10-02 ASSESSMENT — KERATOMETRY
OD_K2POWER_DIOPTERS: 46.00
OS_K1POWER_DIOPTERS: 45.75
OD_AXISANGLE_DEGREES: 066
OS_AXISANGLE_DEGREES: 143
METHOD_AUTO_MANUAL: AUTO
OD_K1POWER_DIOPTERS: 45.25
OS_K2POWER_DIOPTERS: 46.00

## 2024-10-02 ASSESSMENT — TONOMETRY
OD_IOP_MMHG: 15
OS_IOP_MMHG: 15

## 2024-12-31 ENCOUNTER — APPOINTMENT (OUTPATIENT)
Facility: CLINIC | Age: 64
End: 2024-12-31

## 2025-03-12 ENCOUNTER — AMBULATORY SURGERY CENTER (OUTPATIENT)
Dept: URBAN - METROPOLITAN AREA SURGERY 21 | Facility: SURGERY | Age: 65
Setting detail: OPHTHALMOLOGY
End: 2025-03-12
Payer: MEDICARE

## 2025-03-12 DIAGNOSIS — H52.222: ICD-10-CM

## 2025-03-12 DIAGNOSIS — H25.12: ICD-10-CM

## 2025-03-12 PROCEDURE — S9986 NOT MEDICALLY NECESSARY SVC: HCPCS | Mod: GX,GY | Performed by: OPHTHALMOLOGY

## 2025-03-12 PROCEDURE — FEMTO PRECISION LASER CATARACT SURGERY: Mod: GY | Performed by: OPHTHALMOLOGY

## 2025-03-12 PROCEDURE — 66984 XCAPSL CTRC RMVL W/O ECP: CPT | Mod: LT | Performed by: OPHTHALMOLOGY

## 2025-03-13 ENCOUNTER — OFFICE (OUTPATIENT)
Dept: URBAN - METROPOLITAN AREA CLINIC 27 | Facility: CLINIC | Age: 65
Setting detail: OPHTHALMOLOGY
End: 2025-03-13
Payer: MEDICARE

## 2025-03-13 ENCOUNTER — RX ONLY (RX ONLY)
Age: 65
End: 2025-03-13

## 2025-03-13 DIAGNOSIS — Z96.1: ICD-10-CM

## 2025-03-13 PROCEDURE — 99024 POSTOP FOLLOW-UP VISIT: CPT | Performed by: OPHTHALMOLOGY

## 2025-03-13 ASSESSMENT — KERATOMETRY
METHOD_AUTO_MANUAL: AUTO
OS_AXISANGLE_DEGREES: 131
OD_K1POWER_DIOPTERS: 45.50
OS_K1POWER_DIOPTERS: 45.75
OD_K2POWER_DIOPTERS: 46.00
OS_K2POWER_DIOPTERS: 46.00
OD_AXISANGLE_DEGREES: 66

## 2025-03-13 ASSESSMENT — TONOMETRY
OS_IOP_MMHG: 16
OS_IOP_MMHG: 16
OD_IOP_MMHG: 15

## 2025-03-13 ASSESSMENT — REFRACTION_AUTOREFRACTION
OD_SPHERE: -1.00
OS_SPHERE: 0.00
OD_CYLINDER: +0.75
OD_AXIS: 163
OS_AXIS: 176
OS_CYLINDER: +1.00

## 2025-03-13 ASSESSMENT — VISUAL ACUITY
OD_BCVA: 20/25
OS_BCVA: 20/30

## 2025-03-13 ASSESSMENT — CONFRONTATIONAL VISUAL FIELD TEST (CVF)
OD_FINDINGS: FULL
OS_FINDINGS: FULL

## 2025-03-19 ENCOUNTER — OFFICE (OUTPATIENT)
Dept: URBAN - METROPOLITAN AREA CLINIC 27 | Facility: CLINIC | Age: 65
Setting detail: OPHTHALMOLOGY
End: 2025-03-19
Payer: MEDICARE

## 2025-03-19 DIAGNOSIS — Z96.1: ICD-10-CM

## 2025-03-19 DIAGNOSIS — H18.513: ICD-10-CM

## 2025-03-19 PROBLEM — H25.11 CATARACT SENILE NUCLEAR SCLEROSIS; RIGHT EYE: Status: ACTIVE | Noted: 2025-03-13

## 2025-03-19 PROBLEM — H25.11 CATARACT NUCLEAR SCLEROSIS AGE RELATED; RIGHT EYE: Status: ACTIVE | Noted: 2025-03-13

## 2025-03-19 PROCEDURE — 99024 POSTOP FOLLOW-UP VISIT: CPT | Performed by: OPHTHALMOLOGY

## 2025-03-19 ASSESSMENT — REFRACTION_AUTOREFRACTION
OD_SPHERE: -1.25
OD_CYLINDER: +0.75
OS_SPHERE: -0.25
OS_CYLINDER: +1.00
OD_AXIS: 179
OS_AXIS: 171

## 2025-03-19 ASSESSMENT — KERATOMETRY
OS_AXISANGLE_DEGREES: 90
OS_K1POWER_DIOPTERS: 45.75
METHOD_AUTO_MANUAL: AUTO
OS_K2POWER_DIOPTERS: 45.75
OD_K1POWER_DIOPTERS: 45.00
OD_AXISANGLE_DEGREES: 61
OD_K2POWER_DIOPTERS: 46.00

## 2025-03-19 ASSESSMENT — CONFRONTATIONAL VISUAL FIELD TEST (CVF)
OD_FINDINGS: FULL
OS_FINDINGS: FULL

## 2025-03-19 ASSESSMENT — TONOMETRY
OS_IOP_MMHG: 15
OD_IOP_MMHG: 17
OS_IOP_MMHG: 14

## 2025-03-19 ASSESSMENT — CORNEAL DYSTROPHY - POSTERIOR
OS_POSTERIORDYSTROPHY: 2+ GUTTATA
OD_POSTERIORDYSTROPHY: 2+ GUTTATA

## 2025-03-19 ASSESSMENT — VISUAL ACUITY
OS_BCVA: 20/30
OD_BCVA: 20/20

## 2025-04-14 ENCOUNTER — OFFICE (OUTPATIENT)
Dept: URBAN - METROPOLITAN AREA CLINIC 27 | Facility: CLINIC | Age: 65
Setting detail: OPHTHALMOLOGY
End: 2025-04-14
Payer: MEDICARE

## 2025-04-14 DIAGNOSIS — H18.513: ICD-10-CM

## 2025-04-14 DIAGNOSIS — Z96.1: ICD-10-CM

## 2025-04-14 PROCEDURE — 99024 POSTOP FOLLOW-UP VISIT: CPT | Performed by: OPHTHALMOLOGY

## 2025-04-14 ASSESSMENT — VISUAL ACUITY
OD_BCVA: 20/30
OS_BCVA: 20/40

## 2025-04-14 ASSESSMENT — CONFRONTATIONAL VISUAL FIELD TEST (CVF)
OS_FINDINGS: FULL
OD_FINDINGS: FULL

## 2025-04-14 ASSESSMENT — KERATOMETRY
OS_K1POWER_DIOPTERS: 46.00
OD_AXISANGLE_DEGREES: 60
OS_AXISANGLE_DEGREES: 00
OD_K1POWER_DIOPTERS: 45.25
OS_K2POWER_DIOPTERS: 46.00
METHOD_AUTO_MANUAL: AUTO
OD_K2POWER_DIOPTERS: 46.00

## 2025-04-14 ASSESSMENT — TONOMETRY
OD_IOP_MMHG: 16
OS_IOP_MMHG: 11
OS_IOP_MMHG: 11

## 2025-04-14 ASSESSMENT — REFRACTION_AUTOREFRACTION
OD_AXIS: 163
OD_SPHERE: -1.00
OS_SPHERE: -0.25
OS_CYLINDER: +1.25
OS_AXIS: 171
OD_CYLINDER: +0.50

## 2025-04-14 ASSESSMENT — CORNEAL DYSTROPHY - POSTERIOR
OD_POSTERIORDYSTROPHY: 2+ GUTTATA
OS_POSTERIORDYSTROPHY: 2+ GUTTATA

## 2025-07-16 ENCOUNTER — OFFICE (OUTPATIENT)
Dept: URBAN - METROPOLITAN AREA CLINIC 27 | Facility: CLINIC | Age: 65
Setting detail: OPHTHALMOLOGY
End: 2025-07-16
Payer: MEDICARE

## 2025-07-16 DIAGNOSIS — H18.513: ICD-10-CM

## 2025-07-16 DIAGNOSIS — H25.11: ICD-10-CM

## 2025-07-16 PROCEDURE — 92136 OPHTHALMIC BIOMETRY: CPT | Mod: 26,RT | Performed by: OPHTHALMOLOGY

## 2025-07-16 PROCEDURE — 92136 OPHTHALMIC BIOMETRY: CPT | Mod: TC | Performed by: OPHTHALMOLOGY

## 2025-07-16 PROCEDURE — 99213 OFFICE O/P EST LOW 20 MIN: CPT | Performed by: OPHTHALMOLOGY

## 2025-07-16 ASSESSMENT — TONOMETRY
OD_IOP_MMHG: 17
OS_IOP_MMHG: 12

## 2025-07-16 ASSESSMENT — KERATOMETRY
OD_K2POWER_DIOPTERS: 45.75
OS_K1POWER_DIOPTERS: 45.75
METHOD_AUTO_MANUAL: AUTO
OS_K2POWER_DIOPTERS: 46.00
OD_AXISANGLE_DEGREES: 64
OD_K1POWER_DIOPTERS: 45.25
OS_AXISANGLE_DEGREES: 152

## 2025-07-16 ASSESSMENT — REFRACTION_AUTOREFRACTION
OS_AXIS: 171
OD_AXIS: 152
OS_SPHERE: -0.25
OS_CYLINDER: +1.25
OD_CYLINDER: +0.50
OD_SPHERE: -1.25

## 2025-07-16 ASSESSMENT — CORNEAL DYSTROPHY - POSTERIOR
OS_POSTERIORDYSTROPHY: 2+ GUTTATA
OD_POSTERIORDYSTROPHY: 2+ GUTTATA

## 2025-07-16 ASSESSMENT — VISUAL ACUITY
OS_BCVA: 20/40
OD_BCVA: 20/25-1

## 2025-07-29 ENCOUNTER — AMBULATORY SURGERY CENTER (OUTPATIENT)
Dept: URBAN - METROPOLITAN AREA SURGERY 21 | Facility: SURGERY | Age: 65
Setting detail: OPHTHALMOLOGY
End: 2025-07-29
Payer: MEDICARE

## 2025-07-29 DIAGNOSIS — H52.221: ICD-10-CM

## 2025-07-29 DIAGNOSIS — H25.11: ICD-10-CM

## 2025-07-29 PROCEDURE — 66984 XCAPSL CTRC RMVL W/O ECP: CPT | Mod: RT | Performed by: OPHTHALMOLOGY

## 2025-07-29 PROCEDURE — FEMTO PRECISION LASER CATARACT SURGERY: Mod: GY | Performed by: OPHTHALMOLOGY

## 2025-07-29 PROCEDURE — S9986 NOT MEDICALLY NECESSARY SVC: HCPCS | Mod: GX,GY | Performed by: OPHTHALMOLOGY

## 2025-07-30 ENCOUNTER — OFFICE (OUTPATIENT)
Dept: URBAN - METROPOLITAN AREA CLINIC 27 | Facility: CLINIC | Age: 65
Setting detail: OPHTHALMOLOGY
End: 2025-07-30
Payer: MEDICARE

## 2025-07-30 DIAGNOSIS — H18.513: ICD-10-CM

## 2025-07-30 DIAGNOSIS — Z96.1: ICD-10-CM

## 2025-07-30 PROCEDURE — 99024 POSTOP FOLLOW-UP VISIT: CPT | Performed by: OPHTHALMOLOGY

## 2025-07-30 ASSESSMENT — REFRACTION_AUTOREFRACTION
OD_AXIS: 20
OD_CYLINDER: +0.25
OS_SPHERE: -0.25
OS_CYLINDER: +1.25
OS_AXIS: 163
OD_SPHERE: -0.75

## 2025-07-30 ASSESSMENT — CORNEAL DYSTROPHY - POSTERIOR
OS_POSTERIORDYSTROPHY: 2+ GUTTATA
OD_POSTERIORDYSTROPHY: 2+ GUTTATA

## 2025-07-30 ASSESSMENT — KERATOMETRY
OS_K2POWER_DIOPTERS: 46.25
METHOD_AUTO_MANUAL: AUTO
OS_AXISANGLE_DEGREES: 134
OD_K1POWER_DIOPTERS: 45.00
OS_K1POWER_DIOPTERS: 45.75
OD_K2POWER_DIOPTERS: 46.25
OD_AXISANGLE_DEGREES: 103

## 2025-07-30 ASSESSMENT — CONFRONTATIONAL VISUAL FIELD TEST (CVF)
OS_FINDINGS: FULL
OD_FINDINGS: FULL

## 2025-07-30 ASSESSMENT — TONOMETRY
OS_IOP_MMHG: 13
OD_IOP_MMHG: 18
OD_IOP_MMHG: 17

## 2025-07-30 ASSESSMENT — VISUAL ACUITY
OS_BCVA: 20/20-2
OD_BCVA: 20/25-1

## 2025-07-30 ASSESSMENT — CORNEAL EDEMA CLINICAL DESCRIPTION: OD_CORNEALEDEMA: 1+

## 2025-08-06 ENCOUNTER — OFFICE (OUTPATIENT)
Dept: URBAN - METROPOLITAN AREA CLINIC 27 | Facility: CLINIC | Age: 65
Setting detail: OPHTHALMOLOGY
End: 2025-08-06
Payer: MEDICARE

## 2025-08-06 ENCOUNTER — RX ONLY (RX ONLY)
Age: 65
End: 2025-08-06

## 2025-08-06 DIAGNOSIS — Z96.1: ICD-10-CM

## 2025-08-06 DIAGNOSIS — H18.513: ICD-10-CM

## 2025-08-06 PROCEDURE — 99024 POSTOP FOLLOW-UP VISIT: CPT | Performed by: OPHTHALMOLOGY

## 2025-08-06 ASSESSMENT — VISUAL ACUITY
OS_BCVA: 20/20
OD_BCVA: 20/25

## 2025-08-06 ASSESSMENT — KERATOMETRY
OD_AXISANGLE_DEGREES: 64
OS_AXISANGLE_DEGREES: 141
OD_K1POWER_DIOPTERS: 45.00
OS_K2POWER_DIOPTERS: 46.00
METHOD_AUTO_MANUAL: AUTO
OD_K2POWER_DIOPTERS: 45.75
OS_K1POWER_DIOPTERS: 45.75

## 2025-08-06 ASSESSMENT — REFRACTION_AUTOREFRACTION
OS_AXIS: 169
OD_AXIS: 38
OS_CYLINDER: +1.25
OS_SPHERE: -0.25
OD_SPHERE: -0.75
OD_CYLINDER: +0.50

## 2025-08-06 ASSESSMENT — TONOMETRY
OS_IOP_MMHG: 13
OD_IOP_MMHG: 10
OD_IOP_MMHG: 13

## 2025-08-06 ASSESSMENT — CORNEAL DYSTROPHY - POSTERIOR
OD_POSTERIORDYSTROPHY: 2+ GUTTATA
OS_POSTERIORDYSTROPHY: 2+ GUTTATA

## 2025-08-06 ASSESSMENT — CORNEAL EDEMA CLINICAL DESCRIPTION: OD_CORNEALEDEMA: ABSENT

## 2025-08-27 ENCOUNTER — OFFICE (OUTPATIENT)
Dept: URBAN - METROPOLITAN AREA CLINIC 27 | Facility: CLINIC | Age: 65
Setting detail: OPHTHALMOLOGY
End: 2025-08-27
Payer: MEDICARE

## 2025-08-27 DIAGNOSIS — H18.513: ICD-10-CM

## 2025-08-27 DIAGNOSIS — Z96.1: ICD-10-CM

## 2025-08-27 PROCEDURE — 99024 POSTOP FOLLOW-UP VISIT: CPT | Performed by: OPHTHALMOLOGY

## 2025-08-27 ASSESSMENT — CORNEAL EDEMA CLINICAL DESCRIPTION: OD_CORNEALEDEMA: ABSENT

## 2025-08-27 ASSESSMENT — KERATOMETRY
OS_K1POWER_DIOPTERS: 46.00
OS_K2POWER_DIOPTERS: 46.00
OD_K1POWER_DIOPTERS: 45.00
OS_AXISANGLE_DEGREES: 90
OD_AXISANGLE_DEGREES: 53
METHOD_AUTO_MANUAL: AUTO
OD_K2POWER_DIOPTERS: 45.75

## 2025-08-27 ASSESSMENT — TONOMETRY
OD_IOP_MMHG: 14
OD_IOP_MMHG: 12
OS_IOP_MMHG: 14
OS_IOP_MMHG: 12

## 2025-08-27 ASSESSMENT — REFRACTION_AUTOREFRACTION
OS_CYLINDER: +1.25
OD_CYLINDER: +0.50
OD_AXIS: 35
OS_AXIS: 170
OS_SPHERE: -0.25
OD_SPHERE: -0.75

## 2025-08-27 ASSESSMENT — CORNEAL DYSTROPHY - POSTERIOR
OS_POSTERIORDYSTROPHY: 2+ GUTTATA
OD_POSTERIORDYSTROPHY: 2+ GUTTATA

## 2025-08-27 ASSESSMENT — VISUAL ACUITY
OS_BCVA: 20/20
OD_BCVA: 20/25

## 2025-09-04 ENCOUNTER — APPOINTMENT (OUTPATIENT)
Facility: CLINIC | Age: 65
End: 2025-09-04
Payer: MEDICARE

## 2025-09-04 VITALS — SYSTOLIC BLOOD PRESSURE: 149 MMHG | DIASTOLIC BLOOD PRESSURE: 80 MMHG

## 2025-09-04 LAB
BILIRUB UR QL STRIP: NORMAL
GLUCOSE UR-MCNC: NORMAL
HCG UR QL: 0.2 EU/DL
HGB UR QL STRIP.AUTO: NORMAL
KETONES UR-MCNC: NORMAL
LEUKOCYTE ESTERASE UR QL STRIP: NORMAL
NITRITE UR QL STRIP: NORMAL
PH UR STRIP: 6
PROT UR STRIP-MCNC: NORMAL
SP GR UR STRIP: >=1.03

## 2025-09-04 PROCEDURE — 81003 URINALYSIS AUTO W/O SCOPE: CPT | Mod: QW

## 2025-09-04 PROCEDURE — G0101: CPT

## 2025-09-06 LAB
APPEARANCE: CLEAR
BACTERIA: NEGATIVE /HPF
BILIRUBIN URINE: NEGATIVE
BLOOD URINE: ABNORMAL
CAST: 0 /LPF
COLOR: YELLOW
EPITHELIAL CELLS: 0 /HPF
GLUCOSE QUALITATIVE U: NEGATIVE MG/DL
HPV HIGH+LOW RISK DNA PNL CVX: NOT DETECTED
KETONES URINE: NEGATIVE MG/DL
LEUKOCYTE ESTERASE URINE: ABNORMAL
MICROSCOPIC-UA: NORMAL
NITRITE URINE: NEGATIVE
PH URINE: 6
PROTEIN URINE: NEGATIVE MG/DL
RED BLOOD CELLS URINE: 1 /HPF
SPECIFIC GRAVITY URINE: 1.02
URINE CYTOLOGY: NORMAL
UROBILINOGEN URINE: 0.2 MG/DL
WHITE BLOOD CELLS URINE: 0 /HPF

## 2025-09-09 LAB — CYTOLOGY CVX/VAG DOC THIN PREP: ABNORMAL
